# Patient Record
Sex: MALE | Race: WHITE
[De-identification: names, ages, dates, MRNs, and addresses within clinical notes are randomized per-mention and may not be internally consistent; named-entity substitution may affect disease eponyms.]

---

## 2021-12-28 ENCOUNTER — HOSPITAL ENCOUNTER (EMERGENCY)
Dept: HOSPITAL 97 - ER | Age: 31
LOS: 1 days | Discharge: HOME | End: 2021-12-29
Payer: COMMERCIAL

## 2021-12-28 DIAGNOSIS — Z20.822: ICD-10-CM

## 2021-12-28 DIAGNOSIS — R07.9: Primary | ICD-10-CM

## 2021-12-28 LAB
HCT VFR BLD CALC: 46.6 % (ref 39.6–49)
LYMPHOCYTES # SPEC AUTO: 2.3 K/UL (ref 0.7–4.9)
PMV BLD: 9.6 FL (ref 7.6–11.3)
RBC # BLD: 5.65 M/UL (ref 4.33–5.43)

## 2021-12-28 PROCEDURE — 80076 HEPATIC FUNCTION PANEL: CPT

## 2021-12-28 PROCEDURE — 36415 COLL VENOUS BLD VENIPUNCTURE: CPT

## 2021-12-28 PROCEDURE — 93005 ELECTROCARDIOGRAM TRACING: CPT

## 2021-12-28 PROCEDURE — 83880 ASSAY OF NATRIURETIC PEPTIDE: CPT

## 2021-12-28 PROCEDURE — 80048 BASIC METABOLIC PNL TOTAL CA: CPT

## 2021-12-28 PROCEDURE — 84484 ASSAY OF TROPONIN QUANT: CPT

## 2021-12-28 PROCEDURE — 71045 X-RAY EXAM CHEST 1 VIEW: CPT

## 2021-12-28 PROCEDURE — 85610 PROTHROMBIN TIME: CPT

## 2021-12-28 PROCEDURE — 83735 ASSAY OF MAGNESIUM: CPT

## 2021-12-28 PROCEDURE — 80307 DRUG TEST PRSMV CHEM ANLYZR: CPT

## 2021-12-28 PROCEDURE — 99284 EMERGENCY DEPT VISIT MOD MDM: CPT

## 2021-12-28 PROCEDURE — 85025 COMPLETE CBC W/AUTO DIFF WBC: CPT

## 2021-12-29 VITALS — SYSTOLIC BLOOD PRESSURE: 141 MMHG | DIASTOLIC BLOOD PRESSURE: 84 MMHG | OXYGEN SATURATION: 97 %

## 2021-12-29 VITALS — TEMPERATURE: 98 F

## 2021-12-29 LAB
ALBUMIN SERPL BCP-MCNC: 4.3 G/DL (ref 3.4–5)
ALP SERPL-CCNC: 59 U/L (ref 45–117)
ALT SERPL W P-5'-P-CCNC: 84 U/L (ref 12–78)
AST SERPL W P-5'-P-CCNC: 34 U/L (ref 15–37)
BUN BLD-MCNC: 14 MG/DL (ref 7–18)
GLUCOSE SERPLBLD-MCNC: 104 MG/DL (ref 74–106)
INR BLD: 0.91
MAGNESIUM SERPL-MCNC: 2.5 MG/DL (ref 1.8–2.4)
METHAMPHET UR QL SCN: NEGATIVE
NT-PROBNP SERPL-MCNC: 43 PG/ML (ref ?–125)
POTASSIUM SERPL-SCNC: 4.3 MMOL/L (ref 3.5–5.1)
THC SERPL-MCNC: NEGATIVE NG/ML
TROPONIN (EMERG DEPT USE ONLY): < 0.02 NG/ML (ref 0–0.04)

## 2021-12-29 NOTE — RAD REPORT
EXAM DESCRIPTION:  RAD - Chest Single View - 12/28/2021 11:50 pm

 

CLINICAL HISTORY:  CHEST PAIN.

 

COMPARISON:  None.

 

TECHNIQUE:  Single view AP chest radiograph(s).

 

FINDINGS:  Trace perihilar interstitial thickening. No infiltrate identified. No pleural effusion. No
 pneumothorax. Nonenlarged cardiomediastinal silhouette. No significant osseous abnormality.

 

IMPRESSION:  Trace perihilar interstitial thickening. No infiltrate identified.

 

Electronically signed by:   Antionette Jones MD   12/29/2021 12:44 AM CST Workstation: [id number]

 

 

 

Due to temporary technical issues with the PACS/Fluency reporting system, reports are being signed by
 the in house radiologists without review as a courtesy to insure prompt reporting. The interpreting 
radiologist is fully responsible for the content of the report.

## 2021-12-29 NOTE — EDPHYS
Physician Documentation                                                                           

 [facility]                                                                 

Name: Jose Berger                                                                            

Age: 31 yrs                                                                                       

Sex: Male                                                                                         

: 1990                                                                                   

MRN: [id number]                                                                                   

Arrival Date: 2021                                                                          

Time: 22:16                                                                                       

Account#: [id number]                                                                            

Bed 14                                                                                            

Private MD:                                                                                       

ED Physician Po Gayle                                                                      

HPI:                                                                                              

                                                                                             

00:47 This 31 yrs old Male presents to ER via Ambulatory with complaints of Chest Pain, High  kb  

      Blood Pressure.                                                                             

00:47 The patient or guardian reports chest pain that is located primarily in the anterior    kb  

      chest wall, left. The pain does not radiate. Associated signs and symptoms: The patient     

      has no apparent associated signs or symptoms. The chest pain is described as aching.        

      Duration: The patient or guardian reports a single episode. Modifying factors: The          

      symptoms are alleviated by nothing. the symptoms are aggravated by nothing. Severity of     

      pain: At its worst the pain was mild moderate in the emergency department the pain is       

      unchanged. The patient has not experienced similar symptoms in the past. The patient        

      has not recently seen a physician.                                                          

                                                                                                  

Historical:                                                                                       

- Allergies:                                                                                      

                                                                                             

23:30 No Known Allergies;                                                                     bb  

- Home Meds:                                                                                      

23:30 None [Active];                                                                          bb  

- PMHx:                                                                                           

23:30 None;                                                                                   bb  

- PSHx:                                                                                           

23:30 hernia;                                                                                 bb  

                                                                                                  

- Immunization history:: Adult Immunizations up to date, Client reports receiving the             

  Irving \T\ Irving single-dose vaccine.                                                        

- Social history:: Smoking status: unknown.                                                       

                                                                                                  

                                                                                                  

ROS:                                                                                              

                                                                                             

00:46 Constitutional: Negative for fever, chills, and weight loss.                            kb  

      Cardiovascular: Positive for chest pain, Negative for edema, orthopnea, palpitations,       

      paroxysmal nocturnal dyspnea.                                                               

      All other systems are negative.                                                             

                                                                                                  

Exam:                                                                                             

00:46 Constitutional:  This is a well developed, well nourished patient who is awake, alert,  kb  

      and in no acute distress. Head/Face:  Normocephalic, atraumatic. ENT:  Moist Mucous         

      membranes Cardiovascular:  Regular rate and rhythm with a normal S1 and S2.  No             

      gallops, murmurs, or rubs.  No pulse deficits. Respiratory:  Respirations even and          

      unlabored. No increased work of breathing. Talking in full sentences Abdomen/GI:  Soft,     

      non-tender. No distention Skin:  Warm, dry with normal turgor.  Normal color. MS/           

      Extremity:  Pulses equal, no cyanosis.  Neurovascular intact.  Full, normal range of        

      motion. Neuro:  Awake and alert, GCS 15, oriented to person, place, time, and               

      situation. Moves all extremities. Normal gait. Psych:  Awake, alert, with orientation       

      to person, place and time.  Behavior, mood, and affect are within normal limits.            

                                                                                                  

Vital Signs:                                                                                      

                                                                                             

23:25  / 104; Pulse 76; Resp 16 S; Temp 98(TE); Pulse Ox 100% on R/A; Weight 108.86 kg  bb  

      (R); Height 5 ft. 11 in. (180.34 cm) (R); Pain 3/10;                                        

                                                                                             

01:25  / 84; Pulse 72; Resp 18; Pulse Ox 97% on R/A;                                    sm5 

                                                                                             

23:25 Body Mass Index 33.47 (108.86 kg, 180.34 cm)                                            bb  

                                                                                                  

MDM:                                                                                              

                                                                                             

23:43 Patient medically screened.                                                             kb  

                                                                                             

00:46 Data reviewed: vital signs, nurses notes. Data interpreted: Pulse oximetry: on room air kb  

      is 100 %. Interpretation: normal.                                                           

01:28 Counseling: I had a detailed discussion with the patient and/or guardian regarding: the kb  

      historical points, exam findings, and any diagnostic results supporting the                 

      discharge/admit diagnosis, lab results, radiology results, the need for outpatient          

      follow up, a family practitioner, to return to the emergency department if symptoms         

      worsen or persist or if there are any questions or concerns that arise at home. ED          

      course: Pain has resolved without intervention.                                             

                                                                                                  

                                                                                             

23:33 Order name: Basic Metabolic Panel                                                         

                                                                                             

23:33 Order name: CBC with Diff                                                                 

                                                                                             

23:33 Order name: LFT's; Complete Time: 00:13                                                   

                                                                                             

23:33 Order name: Magnesium; Complete Time: 00:13                                               

                                                                                             

23:33 Order name: NT PRO-BNP; Complete Time: 00:13                                              

                                                                                             

23:33 Order name: PT-INR; Complete Time: 00:13                                                  

                                                                                             

23:33 Order name: Troponin (emerg Dept Use Only); Complete Time: 00:13                          

                                                                                             

23:33 Order name: XRAY Chest (1 view)                                                           

                                                                                             

23:33 Order name: EKG; Complete Time: 23:34                                                     

                                                                                             

23:33 Order name: Cardiac monitoring; Complete Time: 01:05                                    bb  

                                                                                             

23:33 Order name: COVID-19 SARS RT PCR (Document "Date of Onset" if Symptomatic); Complete    bb  

      Time: 00:25                                                                                 

                                                                                             

23:33 Order name: Basic Metabolic Panel; Complete Time: 00:13                                 EDMS

                                                                                             

23:33 Order name: CBC with Automated Diff; Complete Time: 23:50                               EDMS

                                                                                             

23:43 Order name: UDS; Complete Time: 01:25                                                   kb  

                                                                                             

23:33 Order name: EKG - Nurse/Tech; Complete Time: 23:42                                      bb  

                                                                                             

23:33 Order name: IV Saline Lock; Complete Time: 23:42                                        bb  

                                                                                             

23:33 Order name: Labs collected and sent; Complete Time: 23:42                               bb  

                                                                                             

23:33 Order name: O2 Per Protocol; Complete Time: 01:05                                       bb  

                                                                                             

23:33 Order name: O2 Sat Monitoring; Complete Time: 01:05                                     bb  

                                                                                                  

Administered Medications:                                                                         

No medications were administered                                                                  

                                                                                                  

                                                                                                  

Disposition:                                                                                      

04:05 Co-signature as Attending Physician, Po Gayle MD.                                 [id number] 

                                                                                                  

Disposition Summary:                                                                              

21 01:29                                                                                    

Discharge Ordered                                                                                 

      Location: Home                                                                          kb  

      Condition: Stable                                                                       kb  

      Diagnosis                                                                                   

        - Chest pain, unspecified                                                             kb  

      Followup:                                                                               kb  

        - With: Emergency Department                                                               

        - When: As needed                                                                          

        - Reason: Worsening of condition                                                           

      Followup:                                                                               kb  

        - With: Private Physician                                                                  

        - When: 2 - 3 days                                                                         

        - Reason: Recheck today's complaints, Continuance of care, Re-evaluation by your           

      physician                                                                                   

      Discharge Instructions:                                                                     

        - Discharge Summary Sheet                                                             kb  

        - Nonspecific Chest Pain, Adult, Easy-to-Read                                         kb  

      Forms:                                                                                      

        - Medication Reconciliation Form                                                      kb  

        - Thank You Letter                                                                    kb  

        - Antibiotic Education                                                                kb  

        - Prescription Opioid Use                                                             kb  

Signatures:                                                                                       

Dispatcher [employer]                           EDAicha Marin FNP-C FNP-Ckb Ballard, Brenda, RN                     RN   Po Chauhan MD MD   mh7                                                  

                                                                                                  

Corrections: (The following items were deleted from the chart)                                    

                                                                                             

23:30 23:30 PSHx: None; brad salinas  

                                                                                                  

**************************************************************************************************

## 2021-12-29 NOTE — ER
Nurse's Notes                                                                                     

 [facility]                                                                 

Name: Jose Berger                                                                            

Age: 31 yrs                                                                                       

Sex: Male                                                                                         

: 1990                                                                                   

MRN: [id number]                                                                                   

Arrival Date: 2021                                                                          

Time: 22:16                                                                                       

Account#: [id number]                                                                            

Bed 14                                                                                            

Private MD:                                                                                       

Diagnosis: Chest pain, unspecified                                                                

                                                                                                  

Presentation:                                                                                     

                                                                                             

23:25 Chief complaint: Patient states: he started having chest pain and increased heart rate  bb  

      at approx 2000 tonight pain is intermittent and does not radiate denies having similar      

      symptoms in the past did not feel well during the day. Coronavirus screen: At this          

      time, the client does not indicate any symptoms associated with coronavirus-19. Ebola       

      Screen: No symptoms or risks identified at this time. Initial Sepsis Screen: Does the       

      patient meet any 2 criteria? No. Patient's initial sepsis screen is negative. Does the      

      patient have a suspected source of infection? No. Patient's initial sepsis screen is        

      negative. Risk Assessment: Do you want to hurt yourself or someone else? Patient            

      reports no desire to harm self or others. Onset of symptoms was 2021.          

23:25 Method Of Arrival: Ambulatory                                                           bb  

23:25 Acuity: RAKAN 3                                                                           bb  

                                                                                                  

Triage Assessment:                                                                                

23:30 General: Appears in no apparent distress. Behavior is cooperative, anxious. Pain:       bb  

      Complains of pain in chest Pain currently is 3 out of 10 on a pain scale. Neuro: Level      

      of Consciousness is awake, alert, obeys commands, Oriented to person, place, time,          

      situation. Cardiovascular: Reports chest pain, Capillary refill < 3 seconds Patient's       

      skin is warm and dry. Respiratory: Respiratory effort is even, unlabored, Respiratory       

      pattern is regular. GI: No signs and/or symptoms were reported involving the                

      gastrointestinal system. Derm: Skin is pink, warm \T\ dry. Musculoskeletal: Circulation,    

      motion, and sensation intact.                                                               

                                                                                                  

Historical:                                                                                       

- Allergies:                                                                                      

23:30 No Known Allergies;                                                                     bb  

- Home Meds:                                                                                      

23:30 None [Active];                                                                          bb  

- PMHx:                                                                                           

23:30 None;                                                                                   bb  

- PSHx:                                                                                           

23:30 hernia;                                                                                 bb  

                                                                                                  

- Immunization history:: Adult Immunizations up to date, Client reports receiving the             

  Irving \T\ Irving single-dose vaccine.                                                        

- Social history:: Smoking status: unknown.                                                       

                                                                                                  

                                                                                                  

Screenin/29                                                                                             

01:25 Abuse screen: Denies threats or abuse. Denies injuries from another. Nutritional        sm5 

      screening: No deficits noted. Tuberculosis screening: No symptoms or risk factors           

      identified. Fall Risk No fall in past 12 months (0 pts). No secondary diagnosis (0          

      pts). IV access (20 points). Ambulatory Aid- None/Bed Rest/Nurse Assist (0 pts). Gait-      

      Normal/Bed Rest/Wheelchair (0 pts) Mental Status- Oriented to own ability (0 pts).          

      Total Stark Fall Scale indicates No Risk (0-24 pts).                                        

                                                                                                  

Assessment:                                                                                       

: General: Appears in no apparent distress. Behavior is calm, cooperative. Pain:          sm5 

      Complains of pain in chest Pain does not radiate. Pain began 8pm on . Neuro: No        

      deficits noted. Level of Consciousness is awake, alert, Oriented to person, place,          

      time, situation. Cardiovascular: Reports chest pain, feeling better than earlier            

      Capillary refill < 3 seconds Patient's skin is warm and dry.                                

01:25 Respiratory: No deficits noted. Airway is patent Trachea midline Respiratory effort is  sm5 

      even, unlabored.                                                                            

                                                                                                  

Vital Signs:                                                                                      

                                                                                             

23:25  / 104; Pulse 76; Resp 16 S; Temp 98(TE); Pulse Ox 100% on R/A; Weight 108.86 kg  bb  

      (R); Height 5 ft. 11 in. (180.34 cm) (R); Pain 3/10;                                        

                                                                                             

01:25  / 84; Pulse 72; Resp 18; Pulse Ox 97% on R/A;                                    sm5 

                                                                                             

23:25 Body Mass Index 33.47 (108.86 kg, 180.34 cm)                                            bb  

                                                                                                  

ED Course:                                                                                        

                                                                                             

22:16 Patient arrived in ED.                                                                  ja2 

23:30 Triage completed.                                                                       bb  

23:30 Arm band placed on Patient placed in waiting room, Patient notified of wait time. Labs  bb  

      ordered per protocol. Drawn by ED staff.                                                    

23:43 Aicha Nava FNP-C is PHCP.                                                        kb  

23:43 Po Gayle MD is Attending Physician.                                             kb  

23:51 XRAY Chest (1 view) In Process Unspecified.                                             EDMS

                                                                                             

00:54 Bernarda Anderson, RN is Primary Nurse.                                                      sm5 

01:05 UDS Sent.                                                                               sm5 

01:05 Basic Metabolic Panel Sent.                                                             sm5 

01:05 CBC with Diff Sent.                                                                     sm5 

01:26 Patient has correct armband on for positive identification. Bed in low position. Call   sm5 

      light in reach. Side rails up X2. Cardiac monitor on. Pulse ox on. NIBP on.                 

: No provider procedures requiring assistance completed. Patient maintains SpO2           sm5 

      saturation greater than 95% on room air.                                                    

01:38 IV discontinued, intact, bleeding controlled, No redness/swelling at site. Pressure     sm5 

      dressing applied.                                                                           

                                                                                                  

Administered Medications:                                                                         

No medications were administered                                                                  

                                                                                                  

                                                                                                  

Outcome:                                                                                          

: Discharge ordered by MD. hogan  

01:38 Discharged to home ambulatory, with family.                                             sm5 

:38 Condition: good                                                                             

01:38 Discharge instructions given to patient, family, Instructed on discharge instructions,      

      follow up and referral plans.                                                               

01:38 Patient left the ED.                                                                    5 

                                                                                                  

Signatures:                                                                                       

Dispatcher [employer]                           EDMS                                                 

Aicha Nava, ZAC MANUEL-Therese Cespedes RN                     RN   Kiersten Snowden Sarah, RN                        RN   5                                                  

                                                                                                  

Corrections: (The following items were deleted from the chart)                                    

                                                                                             

23:30 23:30 PSHx: None; brad salinas  

                                                                                             

01:25 01:22 Cardiovascular: Reports chest pain, 5                                           5 

                                                                                                  

**************************************************************************************************